# Patient Record
Sex: FEMALE | Race: WHITE
[De-identification: names, ages, dates, MRNs, and addresses within clinical notes are randomized per-mention and may not be internally consistent; named-entity substitution may affect disease eponyms.]

---

## 2018-09-17 ENCOUNTER — HOSPITAL ENCOUNTER (EMERGENCY)
Dept: HOSPITAL 47 - EC | Age: 63
Discharge: HOME | End: 2018-09-17
Payer: COMMERCIAL

## 2018-09-17 VITALS — TEMPERATURE: 98 F | SYSTOLIC BLOOD PRESSURE: 170 MMHG | HEART RATE: 72 BPM | DIASTOLIC BLOOD PRESSURE: 80 MMHG

## 2018-09-17 VITALS — RESPIRATION RATE: 16 BRPM

## 2018-09-17 DIAGNOSIS — R03.0: ICD-10-CM

## 2018-09-17 DIAGNOSIS — Z88.2: ICD-10-CM

## 2018-09-17 DIAGNOSIS — Z79.82: ICD-10-CM

## 2018-09-17 DIAGNOSIS — Z53.29: ICD-10-CM

## 2018-09-17 DIAGNOSIS — R42: Primary | ICD-10-CM

## 2018-09-17 LAB
ALBUMIN SERPL-MCNC: 4.3 G/DL (ref 3.5–5)
ALP SERPL-CCNC: 66 U/L (ref 38–126)
ALT SERPL-CCNC: 29 U/L (ref 9–52)
ANION GAP SERPL CALC-SCNC: 13 MMOL/L
APTT BLD: 25.5 SEC (ref 22–30)
AST SERPL-CCNC: 23 U/L (ref 14–36)
BASOPHILS # BLD MANUAL: 0.07 K/UL (ref 0–0.2)
BUN SERPL-SCNC: 12 MG/DL (ref 7–17)
CALCIUM SPEC-MCNC: 9.5 MG/DL (ref 8.4–10.2)
CELLS COUNTED: 200
CHLORIDE SERPL-SCNC: 108 MMOL/L (ref 98–107)
CHOLEST SERPL-MCNC: 240 MG/DL (ref ?–200)
CK SERPL-CCNC: 54 U/L (ref 30–135)
CO2 SERPL-SCNC: 23 MMOL/L (ref 22–30)
D DIMER PPP FEU-MCNC: 0.28 MG/L FEU (ref ?–0.6)
ERYTHROCYTE [DISTWIDTH] IN BLOOD BY AUTOMATED COUNT: 4.69 M/UL (ref 3.8–5.4)
ERYTHROCYTE [DISTWIDTH] IN BLOOD: 13 % (ref 11.5–15.5)
GLUCOSE SERPL-MCNC: 120 MG/DL (ref 74–99)
HCT VFR BLD AUTO: 44.4 % (ref 34–46)
HDLC SERPL-MCNC: 51 MG/DL (ref 40–60)
HGB BLD-MCNC: 14.3 GM/DL (ref 11.4–16)
INR PPP: 1 (ref ?–1.2)
LDLC SERPL CALC-MCNC: 171 MG/DL (ref 0–99)
LYMPHOCYTES # BLD MANUAL: 1.17 K/UL (ref 1–4.8)
MCH RBC QN AUTO: 30.5 PG (ref 25–35)
MCHC RBC AUTO-ENTMCNC: 32.2 G/DL (ref 31–37)
MCV RBC AUTO: 94.5 FL (ref 80–100)
MONOCYTES # BLD MANUAL: 0.37 K/UL (ref 0–1)
NEUTROPHILS NFR BLD MANUAL: 79 %
NEUTS SEG # BLD MANUAL: 5.77 K/UL (ref 1.3–7.7)
PH UR: 6.5 [PH] (ref 5–8)
PLATELET # BLD AUTO: 340 K/UL (ref 150–450)
POTASSIUM SERPL-SCNC: 4.4 MMOL/L (ref 3.5–5.1)
PROT SERPL-MCNC: 7.4 G/DL (ref 6.3–8.2)
PT BLD: 10.3 SEC (ref 9–12)
RBC UR QL: 1 /HPF (ref 0–5)
SODIUM SERPL-SCNC: 144 MMOL/L (ref 137–145)
SP GR UR: 1.01 (ref 1–1.03)
SQUAMOUS UR QL AUTO: 2 /HPF (ref 0–4)
TRIGL SERPL-MCNC: 92 MG/DL (ref ?–150)
TROPONIN I SERPL-MCNC: <0.012 NG/ML (ref 0–0.03)
UROBILINOGEN UR QL STRIP: <2 MG/DL (ref ?–2)
WBC # BLD AUTO: 7.3 K/UL (ref 3.8–10.6)
WBC #/AREA URNS HPF: 1 /HPF (ref 0–5)

## 2018-09-17 PROCEDURE — 99285 EMERGENCY DEPT VISIT HI MDM: CPT

## 2018-09-17 PROCEDURE — 84484 ASSAY OF TROPONIN QUANT: CPT

## 2018-09-17 PROCEDURE — 82553 CREATINE MB FRACTION: CPT

## 2018-09-17 PROCEDURE — 81001 URINALYSIS AUTO W/SCOPE: CPT

## 2018-09-17 PROCEDURE — 80061 LIPID PANEL: CPT

## 2018-09-17 PROCEDURE — 80053 COMPREHEN METABOLIC PANEL: CPT

## 2018-09-17 PROCEDURE — 85379 FIBRIN DEGRADATION QUANT: CPT

## 2018-09-17 PROCEDURE — 36415 COLL VENOUS BLD VENIPUNCTURE: CPT

## 2018-09-17 PROCEDURE — 85025 COMPLETE CBC W/AUTO DIFF WBC: CPT

## 2018-09-17 PROCEDURE — 85730 THROMBOPLASTIN TIME PARTIAL: CPT

## 2018-09-17 PROCEDURE — 85610 PROTHROMBIN TIME: CPT

## 2018-09-17 PROCEDURE — 96360 HYDRATION IV INFUSION INIT: CPT

## 2018-09-17 PROCEDURE — 70450 CT HEAD/BRAIN W/O DYE: CPT

## 2018-09-17 PROCEDURE — 93005 ELECTROCARDIOGRAM TRACING: CPT

## 2018-09-17 PROCEDURE — 82550 ASSAY OF CK (CPK): CPT

## 2018-09-17 NOTE — CT
EXAMINATION TYPE: CT brain wo con

 

DATE OF EXAM: 9/17/2018

 

COMPARISON: None

 

HISTORY: Dizziness.

 

CT DLP: 1135 mGycm

 

Unenhanced CT of the brain was performed.  

 

The ventricles, basal cisterns and sulci overlying the cerebral convexities demonstrate mild enlargem
ent. 

 

There is no evidence for intracranial hemorrhage or sulcal effacement.  

 

There is decreased attenuation about the periventricular white matter and deep white matter of both c
erebral hemispheres, compatible with chronic small vessel ischemia. Differential diagnosis does inclu
de demyelination. 

 

No mass effects are seen.No midline shift.

 

Osseous calvarium is intact.  

 

If symptoms persist consider MRI.  

 

IMPRESSION:

 

1. Age related atrophic and chronic small vessel ischemic change without acute intracranial process s
een at this time.

## 2018-09-17 NOTE — P.CRDCN
History of Present Illness


History of present illness: 





This is Dr. Gates dictating a consult on this patient


The patient was interviewed and examined by me





IMPRESSION / ASSESSMENT: 


Persistent dizziness no headache no cardiac symptoms no blurring of vision


D-dimer is normal cardiac enzymes are normal, 2 serial ECGs are normal 


no bradycardia/ arrhythmias


Elevated blood pressures, likely hypertension newly diagnosed


Normal cranial nerve and motor exam


In 2014 her LDL was 129 mm/dL.





PLAN:


2 serial cardiac enzymes, serial ECGs


Observation for at least 4 hours


Head CT, discussed with ER staff


Losartan 25 mg by mouth daily.  1 dose now and I gave her a prescription


If the second set of enzymes is normal, head CT is normal and she ambulates in 

the hallways and is stable, she will go home and follow-up with me in about 2 

weeks


Home blood pressure monitoring


Losartan 25 g by mouth daily, low salt diet


Lipid panel added





HPI


Patient presented with dizziness as if she was spinning and found it difficult 

to get out of bed this morning.  She came to the emergency room for these 

symptoms.  She denies chest discomfort heaviness tightness pressure any angina 

like symptoms.  No shortness of breath.  No loss of consciousness.  No 

palpitations.  No headache no blurred vision


She did admit to eating salty foods over the weekend


The only abnormality found on examination and labs is that her blood pressure 

was elevated consistently on repeat measurements.  She does not carry a 

diagnosis of hypertension from before


Repeat blood pressure after a few hours his 150 mmHg systolic





ROS:


No fever chills or rigors, 


no cough, phlegm or expectoration, 


no nausea, vomiting or diarrhea, 


no hematuria, dysuria, 


no musculoskeletal complaints, 


no strokes or seizures, 


no skin lesions.





EXAMINATION


On examination no JVD no carotid bruits no lower extremity edema line abdomen 

is soft nontender


Heart sounds are completely normal normal S1 normal S2 no murmurs or gallops no 

rub


Breath sounds are clear no adventitious sounds no rhonchi no crackles


With head movements as well as with change in body position there was a mild 

production of her symptoms


Normal cranial nerve exam.  No nystagmus


No motor deficits





REVIEW OF LABS, ECG





Labs were reviewed hemoglobin 14.3, white count normal at 7.3, platelet count 

340,000


D-dimer 0.28


Electrolytes are normal, BUN and creatinine normal


Renal function tests are normal


First troponin is normal





First 12-lead ECG shows sinus rhythm with normal ST segments


Second ECG unchanged, normal ST segments


















































Past Medical History


Past Medical History: No Reported History


Additional Past Surgical History / Comment(s): cyst taken from back


Past Psychological History: No Psychological Hx Reported


Smoking Status: Never smoker


Past Alcohol Use History: None Reported


Past Drug Use History: None Reported





Medications and Allergies


 Home Medications











 Medication  Instructions  Recorded  Confirmed  Type


 


Aspirin [Adult Low Dose Aspirin EC] 81 mg PO DAILY 09/17/18 09/17/18 History


 


Cholecalciferol (Vitamin D3) 2,000 unit PO DAILY 09/17/18 09/17/18 History





[Vitamin D3]    











 Allergies











Allergy/AdvReac Type Severity Reaction Status Date / Time


 


Sulfa (Sulfonamide Allergy  Unknown Verified 09/17/18 10:11





Antibiotics)     














Physical Exam


Vitals: 


 Vital Signs











  Temp Pulse Pulse Pulse Pulse Resp BP


 


 09/17/18 13:38   88     16  156/84


 


 09/17/18 12:18   86     16  171/83


 


 09/17/18 11:14    81  84  81  


 


 09/17/18 09:22  97.4 F L  82     16  154/74














  BP BP BP Pulse Ox


 


 09/17/18 13:38     100


 


 09/17/18 12:18    


 


 09/17/18 11:14  150/87  171/85  149/82 


 


 09/17/18 09:22     97








 Intake and Output











 09/16/18 09/17/18 09/17/18





 22:59 06:59 14:59


 


Other:   


 


  Weight   95.254 kg














Results





 09/17/18 09:55





 09/17/18 09:55


 Cardiac Enzymes











  09/17/18 09/17/18 Range/Units





  09:55 09:55 


 


AST   23  (14-36)  U/L


 


CK-MB (CK-2)  0.6   (0.0-2.4)  ng/mL


 


Troponin I  <0.012   (0.000-0.034)  ng/mL








 Coagulation











  09/17/18 Range/Units





  09:55 


 


PT  10.3  (9.0-12.0)  sec


 


APTT  25.5  (22.0-30.0)  sec








 CBC











  09/17/18 Range/Units





  09:55 


 


WBC  7.3  (3.8-10.6)  k/uL


 


RBC  4.69  (3.80-5.40)  m/uL


 


Hgb  14.3  (11.4-16.0)  gm/dL


 


Hct  44.4  (34.0-46.0)  %


 


Plt Count  340  (150-450)  k/uL








 Comprehensive Metabolic Panel











  09/17/18 Range/Units





  09:55 


 


Sodium  144  (137-145)  mmol/L


 


Potassium  4.4  (3.5-5.1)  mmol/L


 


Chloride  108 H  ()  mmol/L


 


Carbon Dioxide  23  (22-30)  mmol/L


 


BUN  12  (7-17)  mg/dL


 


Creatinine  0.61  (0.52-1.04)  mg/dL


 


Glucose  120 H  (74-99)  mg/dL


 


Calcium  9.5  (8.4-10.2)  mg/dL


 


AST  23  (14-36)  U/L


 


ALT  29  (9-52)  U/L


 


Alkaline Phosphatase  66  ()  U/L


 


Total Protein  7.4  (6.3-8.2)  g/dL


 


Albumin  4.3  (3.5-5.0)  g/dL








 Intake and Output











 09/16/18 09/17/18 09/17/18





 22:59 06:59 14:59


 


Other:   


 


  Weight   95.254 kg








 Patient Weight











 09/18/18





 06:59


 


Weight 95.254 kg








 





 09/17/18 09:55 





 09/17/18 09:55

## 2018-09-17 NOTE — ED
General Adult HPI





- General


Chief complaint: Dizziness


Stated complaint: Dizzy


Time Seen by Provider: 09/17/18 10:02


Source: patient, RN notes reviewed


Mode of arrival: ambulatory


Limitations: no limitations





- History of Present Illness


Initial comments: 





Patient is 63-year-old female presented to the emergency room today with a 

chief complaint of dizziness.  She states started this morning when she got out 

of bed.  She states it was like the room was spinning.  She states that it's 

worse when she stands up better when she is laying down.  Patient states she's 

never had similar symptoms in the past.  Patient denies any other complaints.  

She states sitting down here in the emergency room the dizziness has improved 

some but is still seems to be present. Patient denies any recent fever, chills, 

shortness of breath, chest pain, back pain, abdominal pain, nausea or vomiting, 

numbness or tingling, dysuria or hematuria, constipation or diarrhea, headaches 

or visual changes, or any other complaints.





- Related Data


 Home Medications











 Medication  Instructions  Recorded  Confirmed


 


Aspirin [Adult Low Dose Aspirin EC] 81 mg PO DAILY 09/17/18 09/17/18


 


Cholecalciferol (Vitamin D3) 2,000 unit PO DAILY 09/17/18 09/17/18





[Vitamin D3]   











 Allergies











Allergy/AdvReac Type Severity Reaction Status Date / Time


 


Sulfa (Sulfonamide Allergy  Unknown Verified 09/17/18 10:11





Antibiotics)     














Review of Systems


ROS Statement: 


Those systems with pertinent positive or pertinent negative responses have been 

documented in the HPI.





ROS Other: All systems not noted in ROS Statement are negative.





Past Medical History


Past Medical History: No Reported History


Additional Past Surgical History / Comment(s): cyst taken from back


Past Psychological History: No Psychological Hx Reported


Smoking Status: Never smoker


Past Alcohol Use History: None Reported


Past Drug Use History: None Reported





General Exam





- General Exam Comments


Initial Comments: 





General:  The patient is awake and alert, in no distress, and does not appear 

acutely ill. 


Eye:  Pupils are equal, round and reactive to light. Extra-ocular movements are 

intact.  No nystagmus.  There is normal conjunctiva bilaterally.  No signs of 

icterus.  


Ears, nose, mouth and throat:  There are moist mucous membranes and no oral 

lesions. 


Neck:  The neck is supple, there is no tenderness or JVD.  


Cardiovascular:  There is a regular rate and rhythm. No murmur, rub or gallop 

is appreciated.


Respiratory:  Lungs are clear to auscultation, respirations are non-labored, 

breath sounds are equal.  No wheezes, stridor, rales, or rhonchi.


Musculoskeletal:  Normal ROM, no tenderness.  Sensation intact. 


Neurological:  A&O x 3. CN II-XII intact, There are no obvious motor or sensory 

deficits. Coordination appears grossly intact. Speech is normal.


Skin:  Skin is warm and dry and no rashes or lesions are noted. 


Psychiatric:  Cooperative, appropriate mood & affect, normal judgment.  


Limitations: no limitations





Course


 Vital Signs











  09/17/18 09/17/18 09/17/18





  09:22 11:14 12:18


 


Temperature 97.4 F L  


 


Pulse Rate 82  86


 


Pulse Rate [  81 





Sitting]   


 


Pulse Rate [  84 





Standing]   


 


Pulse Rate [  81 





Supine]   


 


Respiratory 16  16





Rate   


 


Blood Pressure 154/74  171/83


 


Blood Pressure  150/87 





[Sitting]   


 


Blood Pressure  171/85 





[Standing]   


 


Blood Pressure  149/82 





[Supine]   


 


O2 Sat by Pulse 97  





Oximetry   














  09/17/18 09/17/18





  13:38 15:08


 


Temperature  


 


Pulse Rate 88 82


 


Pulse Rate [  





Sitting]  


 


Pulse Rate [  





Standing]  


 


Pulse Rate [  





Supine]  


 


Respiratory 16 16





Rate  


 


Blood Pressure 156/84 158/84


 


Blood Pressure  





[Sitting]  


 


Blood Pressure  





[Standing]  


 


Blood Pressure  





[Supine]  


 


O2 Sat by Pulse 100 98





Oximetry  














- Reevaluation(s)


Reevaluation #1: 





09/17/18 12:00


Patient was seen here the emergency room by Dr. Maldonado recommended repeat 

troponin.  Patient resting comfortably.





09/17/18 14:30


Patient was seen here in the emergency room by Dr. Maldonado who recommended 

giving 25 mg losartan for elevated blood pressure.  Emergency room.  Patient 

still experiencing some dizziness.  Will have CT head performed.  Currently 

awaiting second troponin.








EKG Findings





- EKG Comments:


EKG Findings:: First EKG performed at 1010: Shows normal sinus rhythm at 82 

beats per minute.  MS interval 162.  QRS is 76.  QT//439.  No acute ST 

changes.  Second EKG performed at 1428: Shows normal sinus rhythm at 78 beats 

per minute.  MS interval is 158.  QRS is 74.  QT//408.  No acute change.





Medical Decision Making





- Medical Decision Making





Patient reexamined at this time shows no signs of distress resting comfortably.

  She's been up ambulating in the emergency room walking back and forth no 

dizziness no lightheadedness.  His feeling well.  Repeat EKG shows normal sinus 

rhythm compared to previous here in the emergency room.  Repeat troponin was 

negative.  CT of the brain negative.  Case was discussed with attending 

physician Dr. Craig.  Case was also discussed and seen here in the emergency 

room by Dr. Maldonado believes that his symptoms may be due to mildly elevated 

blood pressure in the emergency room and was given losartan.  Patient 

discharged home with a prescription and advised to follow-up with Dr. Maldonado 

over the next week.





- Lab Data


Result diagrams: 


 09/17/18 09:55





 09/17/18 09:55


 Lab Results











  09/17/18 09/17/18 09/17/18 Range/Units





  09:55 09:55 09:55 


 


WBC   7.3   (3.8-10.6)  k/uL


 


RBC   4.69   (3.80-5.40)  m/uL


 


Hgb   14.3   (11.4-16.0)  gm/dL


 


Hct   44.4   (34.0-46.0)  %


 


MCV   94.5   (80.0-100.0)  fL


 


MCH   30.5   (25.0-35.0)  pg


 


MCHC   32.2   (31.0-37.0)  g/dL


 


RDW   13.0   (11.5-15.5)  %


 


Plt Count   340   (150-450)  k/uL


 


Neutrophils % (Manual)   79   %


 


Lymphocytes % (Manual)   16   %


 


Monocytes % (Manual)   5   %


 


Basophils % (Manual)   1   %


 


Neutrophils # (Manual)   5.77   (1.3-7.7)  k/uL


 


Lymphocytes # (Manual)   1.17   (1.0-4.8)  k/uL


 


Monocytes # (Manual)   0.37   (0-1.0)  k/uL


 


Basophils # (Manual)   0.07   (0-0.2)  k/uL


 


Nucleated RBCs   0   (0-0)  /100 WBC


 


RBC Morphology   Normal   


 


PT     (9.0-12.0)  sec


 


INR     (<1.2)  


 


APTT     (22.0-30.0)  sec


 


D-Dimer     (<0.60)  mg/L FEU


 


Sodium    144  (137-145)  mmol/L


 


Potassium    4.4  (3.5-5.1)  mmol/L


 


Chloride    108 H  ()  mmol/L


 


Carbon Dioxide    23  (22-30)  mmol/L


 


Anion Gap    13  mmol/L


 


BUN    12  (7-17)  mg/dL


 


Creatinine    0.61  (0.52-1.04)  mg/dL


 


Est GFR (CKD-EPI)AfAm    >90  (>60 ml/min/1.73 sqM)  


 


Est GFR (CKD-EPI)NonAf    >90  (>60 ml/min/1.73 sqM)  


 


Glucose    120 H  (74-99)  mg/dL


 


Calcium    9.5  (8.4-10.2)  mg/dL


 


Total Bilirubin    0.4  (0.2-1.3)  mg/dL


 


AST    23  (14-36)  U/L


 


ALT    29  (9-52)  U/L


 


Alkaline Phosphatase    66  ()  U/L


 


Total Creatine Kinase  54    ()  U/L


 


CK-MB (CK-2)  0.6    (0.0-2.4)  ng/mL


 


CK-MB (CK-2) Rel Index  1.1    


 


Troponin I  <0.012    (0.000-0.034)  ng/mL


 


Total Protein    7.4  (6.3-8.2)  g/dL


 


Albumin    4.3  (3.5-5.0)  g/dL


 


Triglycerides     (<150)  mg/dL


 


Cholesterol     (<200)  mg/dL


 


LDL Cholesterol, Calc     (0-99)  mg/dL


 


HDL Cholesterol     (40-60)  mg/dL


 


Urine Color     


 


Urine Appearance     (Clear)  


 


Urine pH     (5.0-8.0)  


 


Ur Specific Gravity     (1.001-1.035)  


 


Urine Protein     (Negative)  


 


Urine Glucose (UA)     (Negative)  


 


Urine Ketones     (Negative)  


 


Urine Blood     (Negative)  


 


Urine Nitrite     (Negative)  


 


Urine Bilirubin     (Negative)  


 


Urine Urobilinogen     (<2.0)  mg/dL


 


Ur Leukocyte Esterase     (Negative)  


 


Urine RBC     (0-5)  /hpf


 


Urine WBC     (0-5)  /hpf


 


Ur Squamous Epith Cells     (0-4)  /hpf














  09/17/18 09/17/18 09/17/18 Range/Units





  09:55 09:55 11:30 


 


WBC     (3.8-10.6)  k/uL


 


RBC     (3.80-5.40)  m/uL


 


Hgb     (11.4-16.0)  gm/dL


 


Hct     (34.0-46.0)  %


 


MCV     (80.0-100.0)  fL


 


MCH     (25.0-35.0)  pg


 


MCHC     (31.0-37.0)  g/dL


 


RDW     (11.5-15.5)  %


 


Plt Count     (150-450)  k/uL


 


Neutrophils % (Manual)     %


 


Lymphocytes % (Manual)     %


 


Monocytes % (Manual)     %


 


Basophils % (Manual)     %


 


Neutrophils # (Manual)     (1.3-7.7)  k/uL


 


Lymphocytes # (Manual)     (1.0-4.8)  k/uL


 


Monocytes # (Manual)     (0-1.0)  k/uL


 


Basophils # (Manual)     (0-0.2)  k/uL


 


Nucleated RBCs     (0-0)  /100 WBC


 


RBC Morphology     


 


PT  10.3    (9.0-12.0)  sec


 


INR  1.0    (<1.2)  


 


APTT  25.5    (22.0-30.0)  sec


 


D-Dimer  0.28    (<0.60)  mg/L FEU


 


Sodium     (137-145)  mmol/L


 


Potassium     (3.5-5.1)  mmol/L


 


Chloride     ()  mmol/L


 


Carbon Dioxide     (22-30)  mmol/L


 


Anion Gap     mmol/L


 


BUN     (7-17)  mg/dL


 


Creatinine     (0.52-1.04)  mg/dL


 


Est GFR (CKD-EPI)AfAm     (>60 ml/min/1.73 sqM)  


 


Est GFR (CKD-EPI)NonAf     (>60 ml/min/1.73 sqM)  


 


Glucose     (74-99)  mg/dL


 


Calcium     (8.4-10.2)  mg/dL


 


Total Bilirubin     (0.2-1.3)  mg/dL


 


AST     (14-36)  U/L


 


ALT     (9-52)  U/L


 


Alkaline Phosphatase     ()  U/L


 


Total Creatine Kinase     ()  U/L


 


CK-MB (CK-2)     (0.0-2.4)  ng/mL


 


CK-MB (CK-2) Rel Index     


 


Troponin I     (0.000-0.034)  ng/mL


 


Total Protein     (6.3-8.2)  g/dL


 


Albumin     (3.5-5.0)  g/dL


 


Triglycerides   92   (<150)  mg/dL


 


Cholesterol   240 H   (<200)  mg/dL


 


LDL Cholesterol, Calc   171 H   (0-99)  mg/dL


 


HDL Cholesterol   51   (40-60)  mg/dL


 


Urine Color    Light Yellow  


 


Urine Appearance    Clear  (Clear)  


 


Urine pH    6.5  (5.0-8.0)  


 


Ur Specific Gravity    1.012  (1.001-1.035)  


 


Urine Protein    Negative  (Negative)  


 


Urine Glucose (UA)    Negative  (Negative)  


 


Urine Ketones    Negative  (Negative)  


 


Urine Blood    Trace H  (Negative)  


 


Urine Nitrite    Negative  (Negative)  


 


Urine Bilirubin    Negative  (Negative)  


 


Urine Urobilinogen    <2.0  (<2.0)  mg/dL


 


Ur Leukocyte Esterase    Negative  (Negative)  


 


Urine RBC    1  (0-5)  /hpf


 


Urine WBC    1  (0-5)  /hpf


 


Ur Squamous Epith Cells    2  (0-4)  /hpf














  09/17/18 Range/Units





  14:20 


 


WBC   (3.8-10.6)  k/uL


 


RBC   (3.80-5.40)  m/uL


 


Hgb   (11.4-16.0)  gm/dL


 


Hct   (34.0-46.0)  %


 


MCV   (80.0-100.0)  fL


 


MCH   (25.0-35.0)  pg


 


MCHC   (31.0-37.0)  g/dL


 


RDW   (11.5-15.5)  %


 


Plt Count   (150-450)  k/uL


 


Neutrophils % (Manual)   %


 


Lymphocytes % (Manual)   %


 


Monocytes % (Manual)   %


 


Basophils % (Manual)   %


 


Neutrophils # (Manual)   (1.3-7.7)  k/uL


 


Lymphocytes # (Manual)   (1.0-4.8)  k/uL


 


Monocytes # (Manual)   (0-1.0)  k/uL


 


Basophils # (Manual)   (0-0.2)  k/uL


 


Nucleated RBCs   (0-0)  /100 WBC


 


RBC Morphology   


 


PT   (9.0-12.0)  sec


 


INR   (<1.2)  


 


APTT   (22.0-30.0)  sec


 


D-Dimer   (<0.60)  mg/L FEU


 


Sodium   (137-145)  mmol/L


 


Potassium   (3.5-5.1)  mmol/L


 


Chloride   ()  mmol/L


 


Carbon Dioxide   (22-30)  mmol/L


 


Anion Gap   mmol/L


 


BUN   (7-17)  mg/dL


 


Creatinine   (0.52-1.04)  mg/dL


 


Est GFR (CKD-EPI)AfAm   (>60 ml/min/1.73 sqM)  


 


Est GFR (CKD-EPI)NonAf   (>60 ml/min/1.73 sqM)  


 


Glucose   (74-99)  mg/dL


 


Calcium   (8.4-10.2)  mg/dL


 


Total Bilirubin   (0.2-1.3)  mg/dL


 


AST   (14-36)  U/L


 


ALT   (9-52)  U/L


 


Alkaline Phosphatase   ()  U/L


 


Total Creatine Kinase   ()  U/L


 


CK-MB (CK-2)   (0.0-2.4)  ng/mL


 


CK-MB (CK-2) Rel Index   


 


Troponin I  0.016  (0.000-0.034)  ng/mL


 


Total Protein   (6.3-8.2)  g/dL


 


Albumin   (3.5-5.0)  g/dL


 


Triglycerides   (<150)  mg/dL


 


Cholesterol   (<200)  mg/dL


 


LDL Cholesterol, Calc   (0-99)  mg/dL


 


HDL Cholesterol   (40-60)  mg/dL


 


Urine Color   


 


Urine Appearance   (Clear)  


 


Urine pH   (5.0-8.0)  


 


Ur Specific Gravity   (1.001-1.035)  


 


Urine Protein   (Negative)  


 


Urine Glucose (UA)   (Negative)  


 


Urine Ketones   (Negative)  


 


Urine Blood   (Negative)  


 


Urine Nitrite   (Negative)  


 


Urine Bilirubin   (Negative)  


 


Urine Urobilinogen   (<2.0)  mg/dL


 


Ur Leukocyte Esterase   (Negative)  


 


Urine RBC   (0-5)  /hpf


 


Urine WBC   (0-5)  /hpf


 


Ur Squamous Epith Cells   (0-4)  /hpf














Disposition


Clinical Impression: 


 Elevated blood pressure reading, Dizziness





Disposition: HOME SELF-CARE


Condition: Good


Instructions:  Dizziness (ED)


Additional Instructions: 


Please use blood pressure medication as discussed.  Please follow-up with 

family doctor in the next 2 days of symptoms have not improved.  Please return 

to emergency room if the symptoms increase or worsen or for any other concerns.


Is patient prescribed a controlled substance at d/c from ED?: No


Referrals: 


Mendy Kelly MD [Primary Care Provider] - 1-2 days


Time of Disposition: 16:16